# Patient Record
Sex: FEMALE | Race: WHITE | NOT HISPANIC OR LATINO | URBAN - METROPOLITAN AREA
[De-identification: names, ages, dates, MRNs, and addresses within clinical notes are randomized per-mention and may not be internally consistent; named-entity substitution may affect disease eponyms.]

---

## 2019-08-30 ENCOUNTER — OUTPATIENT (OUTPATIENT)
Dept: OUTPATIENT SERVICES | Facility: HOSPITAL | Age: 59
LOS: 1 days | End: 2019-08-30
Payer: COMMERCIAL

## 2019-08-30 VITALS
HEIGHT: 64.25 IN | RESPIRATION RATE: 20 BRPM | HEART RATE: 56 BPM | DIASTOLIC BLOOD PRESSURE: 82 MMHG | TEMPERATURE: 98 F | WEIGHT: 200.62 LBS | SYSTOLIC BLOOD PRESSURE: 130 MMHG

## 2019-08-30 DIAGNOSIS — E78.5 HYPERLIPIDEMIA, UNSPECIFIED: ICD-10-CM

## 2019-08-30 DIAGNOSIS — Z98.890 OTHER SPECIFIED POSTPROCEDURAL STATES: Chronic | ICD-10-CM

## 2019-08-30 DIAGNOSIS — K57.32 DIVERTICULITIS OF LARGE INTESTINE WITHOUT PERFORATION OR ABSCESS WITHOUT BLEEDING: ICD-10-CM

## 2019-08-30 DIAGNOSIS — Z29.9 ENCOUNTER FOR PROPHYLACTIC MEASURES, UNSPECIFIED: ICD-10-CM

## 2019-08-30 DIAGNOSIS — Z01.818 ENCOUNTER FOR OTHER PREPROCEDURAL EXAMINATION: ICD-10-CM

## 2019-08-30 DIAGNOSIS — Z98.1 ARTHRODESIS STATUS: Chronic | ICD-10-CM

## 2019-08-30 DIAGNOSIS — Z90.49 ACQUIRED ABSENCE OF OTHER SPECIFIED PARTS OF DIGESTIVE TRACT: Chronic | ICD-10-CM

## 2019-08-30 DIAGNOSIS — Z90.89 ACQUIRED ABSENCE OF OTHER ORGANS: Chronic | ICD-10-CM

## 2019-08-30 LAB
ALBUMIN SERPL ELPH-MCNC: 4.7 G/DL — SIGNIFICANT CHANGE UP (ref 3.3–5.2)
ALP SERPL-CCNC: 72 U/L — SIGNIFICANT CHANGE UP (ref 40–120)
ALT FLD-CCNC: 33 U/L — HIGH
ANION GAP SERPL CALC-SCNC: 11 MMOL/L — SIGNIFICANT CHANGE UP (ref 5–17)
APTT BLD: 33.5 SEC — SIGNIFICANT CHANGE UP (ref 27.5–36.3)
AST SERPL-CCNC: 31 U/L — SIGNIFICANT CHANGE UP
BASOPHILS # BLD AUTO: 0.07 K/UL — SIGNIFICANT CHANGE UP (ref 0–0.2)
BASOPHILS NFR BLD AUTO: 1 % — SIGNIFICANT CHANGE UP (ref 0–2)
BILIRUB SERPL-MCNC: <0.2 MG/DL — LOW (ref 0.4–2)
BLD GP AB SCN SERPL QL: SIGNIFICANT CHANGE UP
BUN SERPL-MCNC: 14 MG/DL — SIGNIFICANT CHANGE UP (ref 8–20)
CALCIUM SERPL-MCNC: 9.5 MG/DL — SIGNIFICANT CHANGE UP (ref 8.6–10.2)
CHLORIDE SERPL-SCNC: 103 MMOL/L — SIGNIFICANT CHANGE UP (ref 98–107)
CO2 SERPL-SCNC: 25 MMOL/L — SIGNIFICANT CHANGE UP (ref 22–29)
CREAT SERPL-MCNC: 0.7 MG/DL — SIGNIFICANT CHANGE UP (ref 0.5–1.3)
EOSINOPHIL # BLD AUTO: 0.39 K/UL — SIGNIFICANT CHANGE UP (ref 0–0.5)
EOSINOPHIL NFR BLD AUTO: 5.4 % — SIGNIFICANT CHANGE UP (ref 0–6)
GLUCOSE SERPL-MCNC: 112 MG/DL — SIGNIFICANT CHANGE UP (ref 70–115)
HBA1C BLD-MCNC: 5.6 % — SIGNIFICANT CHANGE UP (ref 4–5.6)
HCT VFR BLD CALC: 45.7 % — HIGH (ref 34.5–45)
HGB BLD-MCNC: 15.2 G/DL — SIGNIFICANT CHANGE UP (ref 11.5–15.5)
IMM GRANULOCYTES NFR BLD AUTO: 0.4 % — SIGNIFICANT CHANGE UP (ref 0–1.5)
INR BLD: 0.97 RATIO — SIGNIFICANT CHANGE UP (ref 0.88–1.16)
LYMPHOCYTES # BLD AUTO: 1.49 K/UL — SIGNIFICANT CHANGE UP (ref 1–3.3)
LYMPHOCYTES # BLD AUTO: 20.7 % — SIGNIFICANT CHANGE UP (ref 13–44)
MCHC RBC-ENTMCNC: 32.1 PG — SIGNIFICANT CHANGE UP (ref 27–34)
MCHC RBC-ENTMCNC: 33.3 GM/DL — SIGNIFICANT CHANGE UP (ref 32–36)
MCV RBC AUTO: 96.4 FL — SIGNIFICANT CHANGE UP (ref 80–100)
MONOCYTES # BLD AUTO: 0.6 K/UL — SIGNIFICANT CHANGE UP (ref 0–0.9)
MONOCYTES NFR BLD AUTO: 8.3 % — SIGNIFICANT CHANGE UP (ref 2–14)
NEUTROPHILS # BLD AUTO: 4.61 K/UL — SIGNIFICANT CHANGE UP (ref 1.8–7.4)
NEUTROPHILS NFR BLD AUTO: 64.2 % — SIGNIFICANT CHANGE UP (ref 43–77)
PLATELET # BLD AUTO: 271 K/UL — SIGNIFICANT CHANGE UP (ref 150–400)
POTASSIUM SERPL-MCNC: 4.9 MMOL/L — SIGNIFICANT CHANGE UP (ref 3.5–5.3)
POTASSIUM SERPL-SCNC: 4.9 MMOL/L — SIGNIFICANT CHANGE UP (ref 3.5–5.3)
PROT SERPL-MCNC: 7.2 G/DL — SIGNIFICANT CHANGE UP (ref 6.6–8.7)
PROTHROM AB SERPL-ACNC: 11.1 SEC — SIGNIFICANT CHANGE UP (ref 10–12.9)
RBC # BLD: 4.74 M/UL — SIGNIFICANT CHANGE UP (ref 3.8–5.2)
RBC # FLD: 12.3 % — SIGNIFICANT CHANGE UP (ref 10.3–14.5)
SODIUM SERPL-SCNC: 139 MMOL/L — SIGNIFICANT CHANGE UP (ref 135–145)
WBC # BLD: 7.19 K/UL — SIGNIFICANT CHANGE UP (ref 3.8–10.5)
WBC # FLD AUTO: 7.19 K/UL — SIGNIFICANT CHANGE UP (ref 3.8–10.5)

## 2019-08-30 PROCEDURE — 71046 X-RAY EXAM CHEST 2 VIEWS: CPT | Mod: 26

## 2019-08-30 PROCEDURE — 93010 ELECTROCARDIOGRAM REPORT: CPT

## 2019-08-30 RX ORDER — CEFOTETAN DISODIUM 1 G
2 VIAL (EA) INJECTION ONCE
Refills: 0 | Status: DISCONTINUED | OUTPATIENT
Start: 2019-09-06 | End: 2019-09-09

## 2019-08-30 NOTE — H&P PST ADULT - NSICDXPROBLEM_GEN_ALL_CORE_FT
PROBLEM DIAGNOSES  Problem: Need for prophylactic measure  Assessment and Plan: high risk for VTe event, the surgical team will evaluate for appropriate VTE prophylaxis     Problem: HLD (hyperlipidemia)  Assessment and Plan: routine labs and ekg  medical clearance     Problem: Diverticulitis of colon  Assessment and Plan: laparoscopic possible open resection of sigmoid diverticulitis and all related procedures and stent placement with DR. Jean

## 2019-08-30 NOTE — H&P PST ADULT - NSICDXPASTSURGICALHX_GEN_ALL_CORE_FT
PAST SURGICAL HISTORY:  H/O hand surgery right hand PAST SURGICAL HISTORY:  H/O hand surgery right hand    S/P cervical spinal fusion     S/P colon resection sigmoid colon    S/P tonsillectomy

## 2019-08-30 NOTE — H&P PST ADULT - NSICDXPASTMEDICALHX_GEN_ALL_CORE_FT
PAST MEDICAL HISTORY:  Blood clot in vein right arm after Portacath insertion, Had coumadin therapy, finished 4/2013    Depression     History of diverticulitis     History of diverticulosis     HLD (hyperlipidemia)     HNP (herniated nucleus pulposus), cervical     Hypothyroid     DOT on CPAP @13    Perforated diverticulum     Seizure no on medication, has been sz free for 4 years

## 2019-08-30 NOTE — PATIENT PROFILE ADULT - NSPROCHRONICPAINLOC_GEN_A_NUR
LMOM that tsh normal, continue current dosing
Priyanka Paniagua called looking for results of his recent lab draw   Thank you
lower abdomen

## 2019-08-30 NOTE — PATIENT PROFILE ADULT - STATED REASON FOR ADMISSION
Detail Level: Generalized
General Sunscreen Counseling: Sun protect with sunscreen and clothing. Remember to reapply sunscreen.
lap possible open resection of sigmoid

## 2019-08-30 NOTE — H&P PST ADULT - ASSESSMENT
59 year old female present with c/o LLQ abd pain.  CAT scan revealed diverticulitis and now the presence  of a large diverticulum adjacent to a prior sigmoid resection and anastomosis.  She is now schedule for laparoscopic possible open resection of sigmoid diverticulitis and all related procedures and ureteral stent placement with DR. Woodal CAPRINI VTE 2.0 SCORE [CLOT updated 2019]    AGE RELATED RISK FACTORS                                                       MOBILITY RELATED FACTORS  [x ] Age 41-60 years                                            (1 Point)                    [ ] Bed rest                                                        (1 Point)  [ ] Age: 61-74 years                                           (2 Points)                  [ ] Plaster cast                                                   (2 Points)  [ ] Age= 75 years                                              (3 Points)                    [ ] Bed bound for more than 72 hours                 (2 Points)    DISEASE RELATED RISK FACTORS                                               GENDER SPECIFIC FACTORS  [ ] Edema in the lower extremities                       (1 Point)              [ ] Pregnancy                                                     (1 Point)  [x ] Varicose veins                                               (1 Point)                     [ ] Post-partum < 6 weeks                                   (1 Point)             [x ] BMI > 25 Kg/m2                                            (1 Point)                     [ ] Hormonal therapy  or oral contraception          (1 Point)                 [ ] Sepsis (in the previous month)                        (1 Point)               [ ] History of pregnancy complications                 (1 point)  [ ] Pneumonia or serious lung disease                                               [ ] Unexplained or recurrent                     (1 Point)           (in the previous month)                               (1 Point)  [ ] Abnormal pulmonary function test                     (1 Point)                 SURGERY RELATED RISK FACTORS  [ ] Acute myocardial infarction                              (1 Point)               [ ]  Section                                             (1 Point)  [ ] Congestive heart failure (in the previous month)  (1 Point)      [ ] Minor surgery                                                  (1 Point)   [x ] Inflammatory bowel disease                             (1 Point)               [ ] Arthroscopic surgery                                        (2 Points)  [ ] Central venous access                                      (2 Points)                [ ]x General surgery lasting more than 45 minutes (2 points)  [ ] Malignancy- Present or previous                   (2 Points)                [ ] Elective arthroplasty                                         (5 points)    [ ] Stroke (in the previous month)                          (5 Points)                                                                                                                                                           HEMATOLOGY RELATED FACTORS                                                 TRAUMA RELATED RISK FACTORS  [x ] Prior episodes of VTE                                     (3 Points)                [ ] Fracture of the hip, pelvis, or leg                       (5 Points)  [ ] Positive family history for VTE                         (3 Points)             [ ] Acute spinal cord injury (in the previous month)  (5 Points)  [ ] Prothrombin 59339 A                                     (3 Points)               [ ] Paralysis  (less than 1 month)                             (5 Points)  [ ] Factor V Leiden                                             (3 Points)                  [ ] Multiple Trauma within 1 month                        (5 Points)  [ ] Lupus anticoagulants                                     (3 Points)                                                           [ ] Anticardiolipin antibodies                               (3 Points)                                                       [ ] High homocysteine in the blood                      (3 Points)                                             [ ] Other congenital or acquired thrombophilia      (3 Points)                                                [ ] Heparin induced thrombocytopenia                  (3 Points)                                     Total Score [    9    ]

## 2019-08-30 NOTE — H&P PST ADULT - NSICDXFAMILYHX_GEN_ALL_CORE_FT
FAMILY HISTORY:  FH: diverticulitis    Father  Still living? Unknown  FH: HTN (hypertension), Age at diagnosis: Age Unknown    Mother  Still living? Unknown  FH: breast cancer, Age at diagnosis: Age Unknown  FH: HTN (hypertension), Age at diagnosis: Age Unknown  FH: thyroid disease, Age at diagnosis: Age Unknown    Sibling  Still living? Unknown  FH: HTN (hypertension), Age at diagnosis: Age Unknown    Grandparent  Still living? Unknown  FH: breast cancer, Age at diagnosis: Age Unknown  FH: colon cancer, Age at diagnosis: Age Unknown  FH: HTN (hypertension), Age at diagnosis: Age Unknown    Aunt  Still living? Unknown  FH: breast cancer, Age at diagnosis: Age Unknown  FH: HTN (hypertension), Age at diagnosis: Age Unknown

## 2019-08-30 NOTE — H&P PST ADULT - HISTORY OF PRESENT ILLNESS
59 year old female present with c/o LLQ abd pain.  CAT scan revealed diverticulitis and now the presence  of a large diverticulum adjacent to a prior sigmoid resection and anastomosis.  She is now schedule for laparoscopic possible open resection of sigmoid diverticulitis and all related procedures and ureteral stent placement with DR. Jean

## 2019-08-30 NOTE — PATIENT PROFILE ADULT - NSPROEDALEARNPREF_GEN_A_NUR
individual instruction/verbal instruction/written material/pre-op instructions, surgical wash & pain management reviewed

## 2019-08-30 NOTE — H&P PST ADULT - NEGATIVE CARDIOVASCULAR SYMPTOMS
no paroxysmal nocturnal dyspnea/no palpitations/no peripheral edema/no chest pain/no dyspnea on exertion/no orthopnea

## 2019-09-05 ENCOUNTER — TRANSCRIPTION ENCOUNTER (OUTPATIENT)
Age: 59
End: 2019-09-05

## 2019-09-06 ENCOUNTER — RESULT REVIEW (OUTPATIENT)
Age: 59
End: 2019-09-06

## 2019-09-06 ENCOUNTER — INPATIENT (INPATIENT)
Facility: HOSPITAL | Age: 59
LOS: 2 days | Discharge: HOME | DRG: 330 | End: 2019-09-09
Attending: SURGERY | Admitting: SURGERY
Payer: COMMERCIAL

## 2019-09-06 VITALS
SYSTOLIC BLOOD PRESSURE: 120 MMHG | TEMPERATURE: 98 F | WEIGHT: 200.62 LBS | OXYGEN SATURATION: 98 % | HEART RATE: 68 BPM | HEIGHT: 64 IN | RESPIRATION RATE: 16 BRPM | DIASTOLIC BLOOD PRESSURE: 80 MMHG

## 2019-09-06 DIAGNOSIS — Z98.1 ARTHRODESIS STATUS: Chronic | ICD-10-CM

## 2019-09-06 DIAGNOSIS — K57.32 DIVERTICULITIS OF LARGE INTESTINE WITHOUT PERFORATION OR ABSCESS WITHOUT BLEEDING: ICD-10-CM

## 2019-09-06 DIAGNOSIS — Z98.890 OTHER SPECIFIED POSTPROCEDURAL STATES: Chronic | ICD-10-CM

## 2019-09-06 DIAGNOSIS — Z90.89 ACQUIRED ABSENCE OF OTHER ORGANS: Chronic | ICD-10-CM

## 2019-09-06 DIAGNOSIS — Z90.49 ACQUIRED ABSENCE OF OTHER SPECIFIED PARTS OF DIGESTIVE TRACT: Chronic | ICD-10-CM

## 2019-09-06 LAB
ABO RH CONFIRMATION: SIGNIFICANT CHANGE UP
GLUCOSE BLDC GLUCOMTR-MCNC: 112 MG/DL — HIGH (ref 70–99)
GLUCOSE BLDC GLUCOMTR-MCNC: 138 MG/DL — HIGH (ref 70–99)
GLUCOSE BLDC GLUCOMTR-MCNC: 159 MG/DL — HIGH (ref 70–99)

## 2019-09-06 PROCEDURE — 85730 THROMBOPLASTIN TIME PARTIAL: CPT

## 2019-09-06 PROCEDURE — 93005 ELECTROCARDIOGRAM TRACING: CPT

## 2019-09-06 PROCEDURE — 71046 X-RAY EXAM CHEST 2 VIEWS: CPT

## 2019-09-06 PROCEDURE — G0463: CPT

## 2019-09-06 PROCEDURE — 86901 BLOOD TYPING SEROLOGIC RH(D): CPT

## 2019-09-06 PROCEDURE — 86850 RBC ANTIBODY SCREEN: CPT

## 2019-09-06 PROCEDURE — 86923 COMPATIBILITY TEST ELECTRIC: CPT

## 2019-09-06 PROCEDURE — 88307 TISSUE EXAM BY PATHOLOGIST: CPT | Mod: 26

## 2019-09-06 PROCEDURE — 85027 COMPLETE CBC AUTOMATED: CPT

## 2019-09-06 PROCEDURE — 36415 COLL VENOUS BLD VENIPUNCTURE: CPT

## 2019-09-06 PROCEDURE — 83036 HEMOGLOBIN GLYCOSYLATED A1C: CPT

## 2019-09-06 PROCEDURE — 80053 COMPREHEN METABOLIC PANEL: CPT

## 2019-09-06 PROCEDURE — 85610 PROTHROMBIN TIME: CPT

## 2019-09-06 PROCEDURE — 86900 BLOOD TYPING SEROLOGIC ABO: CPT

## 2019-09-06 PROCEDURE — 88305 TISSUE EXAM BY PATHOLOGIST: CPT | Mod: 26

## 2019-09-06 RX ORDER — CITALOPRAM 10 MG/1
40 TABLET, FILM COATED ORAL DAILY
Refills: 0 | Status: DISCONTINUED | OUTPATIENT
Start: 2019-09-06 | End: 2019-09-09

## 2019-09-06 RX ORDER — ALVIMOPAN 12 MG/1
12 CAPSULE ORAL
Refills: 0 | Status: DISCONTINUED | OUTPATIENT
Start: 2019-09-07 | End: 2019-09-09

## 2019-09-06 RX ORDER — SODIUM CHLORIDE 9 MG/ML
3 INJECTION INTRAMUSCULAR; INTRAVENOUS; SUBCUTANEOUS EVERY 8 HOURS
Refills: 0 | Status: DISCONTINUED | OUTPATIENT
Start: 2019-09-06 | End: 2019-09-06

## 2019-09-06 RX ORDER — ONDANSETRON 8 MG/1
4 TABLET, FILM COATED ORAL ONCE
Refills: 0 | Status: DISCONTINUED | OUTPATIENT
Start: 2019-09-06 | End: 2019-09-06

## 2019-09-06 RX ORDER — HYDROMORPHONE HYDROCHLORIDE 2 MG/ML
1 INJECTION INTRAMUSCULAR; INTRAVENOUS; SUBCUTANEOUS
Refills: 0 | Status: DISCONTINUED | OUTPATIENT
Start: 2019-09-06 | End: 2019-09-06

## 2019-09-06 RX ORDER — ALVIMOPAN 12 MG/1
12 CAPSULE ORAL ONCE
Refills: 0 | Status: COMPLETED | OUTPATIENT
Start: 2019-09-06 | End: 2019-09-06

## 2019-09-06 RX ORDER — ENOXAPARIN SODIUM 100 MG/ML
40 INJECTION SUBCUTANEOUS DAILY
Refills: 0 | Status: DISCONTINUED | OUTPATIENT
Start: 2019-09-07 | End: 2019-09-09

## 2019-09-06 RX ORDER — BUPIVACAINE 13.3 MG/ML
20 INJECTION, SUSPENSION, LIPOSOMAL INFILTRATION ONCE
Refills: 0 | Status: DISCONTINUED | OUTPATIENT
Start: 2019-09-06 | End: 2019-09-06

## 2019-09-06 RX ORDER — HYDROMORPHONE HYDROCHLORIDE 2 MG/ML
30 INJECTION INTRAMUSCULAR; INTRAVENOUS; SUBCUTANEOUS
Refills: 0 | Status: DISCONTINUED | OUTPATIENT
Start: 2019-09-06 | End: 2019-09-08

## 2019-09-06 RX ORDER — ONDANSETRON 8 MG/1
4 TABLET, FILM COATED ORAL EVERY 6 HOURS
Refills: 0 | Status: DISCONTINUED | OUTPATIENT
Start: 2019-09-06 | End: 2019-09-09

## 2019-09-06 RX ORDER — SODIUM CHLORIDE 9 MG/ML
1000 INJECTION, SOLUTION INTRAVENOUS
Refills: 0 | Status: DISCONTINUED | OUTPATIENT
Start: 2019-09-06 | End: 2019-09-06

## 2019-09-06 RX ORDER — LEVOTHYROXINE SODIUM 125 MCG
1 TABLET ORAL
Qty: 0 | Refills: 0 | DISCHARGE

## 2019-09-06 RX ORDER — TAMSULOSIN HYDROCHLORIDE 0.4 MG/1
0.4 CAPSULE ORAL AT BEDTIME
Refills: 0 | Status: DISCONTINUED | OUTPATIENT
Start: 2019-09-06 | End: 2019-09-09

## 2019-09-06 RX ORDER — CITALOPRAM 10 MG/1
1 TABLET, FILM COATED ORAL
Qty: 0 | Refills: 0 | DISCHARGE

## 2019-09-06 RX ORDER — SODIUM CHLORIDE 9 MG/ML
1000 INJECTION, SOLUTION INTRAVENOUS
Refills: 0 | Status: DISCONTINUED | OUTPATIENT
Start: 2019-09-06 | End: 2019-09-09

## 2019-09-06 RX ORDER — ATORVASTATIN CALCIUM 80 MG/1
20 TABLET, FILM COATED ORAL AT BEDTIME
Refills: 0 | Status: DISCONTINUED | OUTPATIENT
Start: 2019-09-06 | End: 2019-09-09

## 2019-09-06 RX ORDER — ATORVASTATIN CALCIUM 80 MG/1
1 TABLET, FILM COATED ORAL
Qty: 0 | Refills: 0 | DISCHARGE

## 2019-09-06 RX ORDER — LEVOTHYROXINE SODIUM 125 MCG
137 TABLET ORAL DAILY
Refills: 0 | Status: DISCONTINUED | OUTPATIENT
Start: 2019-09-06 | End: 2019-09-09

## 2019-09-06 RX ORDER — ACETAMINOPHEN 500 MG
650 TABLET ORAL EVERY 6 HOURS
Refills: 0 | Status: DISCONTINUED | OUTPATIENT
Start: 2019-09-06 | End: 2019-09-09

## 2019-09-06 RX ADMIN — TAMSULOSIN HYDROCHLORIDE 0.4 MILLIGRAM(S): 0.4 CAPSULE ORAL at 21:30

## 2019-09-06 RX ADMIN — Medication 650 MILLIGRAM(S): at 23:47

## 2019-09-06 RX ADMIN — Medication 650 MILLIGRAM(S): at 19:10

## 2019-09-06 RX ADMIN — SODIUM CHLORIDE 75 MILLILITER(S): 9 INJECTION, SOLUTION INTRAVENOUS at 21:31

## 2019-09-06 RX ADMIN — Medication 650 MILLIGRAM(S): at 18:10

## 2019-09-06 RX ADMIN — HYDROMORPHONE HYDROCHLORIDE 30 MILLILITER(S): 2 INJECTION INTRAMUSCULAR; INTRAVENOUS; SUBCUTANEOUS at 13:51

## 2019-09-06 RX ADMIN — CITALOPRAM 40 MILLIGRAM(S): 10 TABLET, FILM COATED ORAL at 21:30

## 2019-09-06 RX ADMIN — ALVIMOPAN 12 MILLIGRAM(S): 12 CAPSULE ORAL at 07:04

## 2019-09-06 RX ADMIN — ATORVASTATIN CALCIUM 20 MILLIGRAM(S): 80 TABLET, FILM COATED ORAL at 21:30

## 2019-09-06 NOTE — BRIEF OPERATIVE NOTE - NSICDXBRIEFPROCEDURE_GEN_ALL_CORE_FT
PROCEDURES:  Ventral hernia repair with mesh 06-Sep-2019 13:34:22  Nicholas Moise  Laparoscopic low anterior resection 06-Sep-2019 13:33:58  Nicholas Moise

## 2019-09-06 NOTE — BRIEF OPERATIVE NOTE - OPERATION/FINDINGS
Pt w/ diverticula near prior sigmoid resection site. performed laparoscopic hand assisted low anterior resection performed w/ removal of prior anastomosis and diverticular disease. Incisional hernia repaired w/ parietex mesh.

## 2019-09-06 NOTE — ASU PREOP CHECKLIST - SELECT TESTS ORDERED
INR/Type and Screen/CXR/EKG/BMP/CBC/PT/PTT 112/BMP/POCT Blood Glucose/CBC/Type and Screen/PT/PTT/INR/EKG/CXR

## 2019-09-06 NOTE — CONSULT NOTE ADULT - SUBJECTIVE AND OBJECTIVE BOX
HPI:  9 year old female present with c/o LLQ abd pain.  CAT scan revealed diverticulitis and now the presence  of a large diverticulum adjacent to a prior sigmoid resection and anastomosis.  She is now schedule for laparoscopic possible open resection of sigmoid diverticulitis and all related procedures and ureteral stent placement with DR. Jean. Post op Now        PAST MEDICAL & SURGICAL HISTORY:  Perforated diverticulum  Hypothyroid  Seizure: no on medication, has been sz free for 4 years  DOT on CPAP: @13  HLD (hyperlipidemia)  History of diverticulitis  History of diverticulosis  Depression  HNP (herniated nucleus pulposus), cervical  Blood clot in vein: right arm after Portacath insertion, Had coumadin therapy, finished 4/2013  S/P tonsillectomy  S/P colon resection: sigmoid colon  S/P cervical spinal fusion  H/O hand surgery: right hand    acetaminophen   Tablet .. 650 milliGRAM(s) Oral every 6 hours  atorvastatin 20 milliGRAM(s) Oral at bedtime  cefoTEtan  IVPB 2 Gram(s) IV Intermittent once  citalopram 40 milliGRAM(s) Oral daily  HYDROmorphone PCA (1 mG/mL) 30 milliLiter(s) PCA Continuous PCA Continuous  lactated ringers. 1000 milliLiter(s) IV Continuous <Continuous>  levothyroxine 137 MICROGram(s) Oral daily  ondansetron Injectable 4 milliGRAM(s) IV Push every 6 hours PRN    MEDICATIONS  (STANDING):  acetaminophen   Tablet .. 650 milliGRAM(s) Oral every 6 hours  atorvastatin 20 milliGRAM(s) Oral at bedtime  cefoTEtan  IVPB 2 Gram(s) IV Intermittent once  citalopram 40 milliGRAM(s) Oral daily  HYDROmorphone PCA (1 mG/mL) 30 milliLiter(s) PCA Continuous PCA Continuous  lactated ringers. 1000 milliLiter(s) (75 mL/Hr) IV Continuous <Continuous>  levothyroxine 137 MICROGram(s) Oral daily    MEDICATIONS  (PRN):  ondansetron Injectable 4 milliGRAM(s) IV Push every 6 hours PRN Nausea and/or Vomiting      Allergies    cortisone (Headache)  Dilantin (Swelling)  Oxymetazoline (Rash)  prednisoLONE (Headache)  Zithromax (Hives)    SOCIAL HISTORY:  NO S/D/IVDU    FAMILY HISTORY:  + Diverticulitis     FH: colon cancer (Grandparent)  FH: HTN (hypertension) (Mother, Grandparent, Aunt, Father, Sibling)  FH: thyroid disease (Mother)  FH: breast cancer (Mother, Grandparent, Aunt)  FH: diverticulitis          ROS  - Headache  - Neck Stiffness  - Chest Pain  - SOB  Mild Abd pain  - Pelvic Pain  - Leg Pain      Vital Signs Last 24 Hrs  T(C): 36.8 (06 Sep 2019 15:17), Max: 37.1 (06 Sep 2019 13:30)  T(F): 98.2 (06 Sep 2019 15:17), Max: 98.8 (06 Sep 2019 13:30)  HR: 80 (06 Sep 2019 15:17) (68 - 90)  BP: 120/76 (06 Sep 2019 15:17) (120/76 - 137/79)  BP(mean): --  RR: 18 (06 Sep 2019 15:17) (15 - 19)  SpO2: 93% (06 Sep 2019 15:17) (93% - 99%)    HEENT: PEARLA  Neck: Supple  Cardio: S1 S2 No Murmur  Pulm: CTA No Rales or Ronchi  Abd: Soft NT Mild Distension BS dec Incisions clean   Rectal - refused  Ext: No DCT  Skin: No Rash  Neuro: Awake Pleasant    Perforated diverticulum - postop pain control and ambulation  Hypothyroid - synthroid   Seizure: free for 4 years  DOT on CPAP - night  monitor   Depression - citalopram HPI:  9 year old female present with c/o LLQ abd pain.  CAT scan revealed diverticulitis and now the presence  of a large diverticulum adjacent to a prior sigmoid resection and anastomosis.  She is now schedule for laparoscopic possible open resection of sigmoid diverticulitis and all related procedures and ureteral stent placement with DR. Jaen. Post op Now        PAST MEDICAL & SURGICAL HISTORY:  Perforated diverticulum  Hypothyroid  Seizure: no on medication, has been sz free for 4 years  DOT on CPAP: @13  HLD (hyperlipidemia)  History of diverticulitis  History of diverticulosis  Depression  HNP (herniated nucleus pulposus), cervical  Blood clot in vein: right arm after Portacath insertion, Had coumadin therapy, finished 4/2013  S/P tonsillectomy  S/P colon resection: sigmoid colon  S/P cervical spinal fusion  H/O hand surgery: right hand    acetaminophen   Tablet .. 650 milliGRAM(s) Oral every 6 hours  atorvastatin 20 milliGRAM(s) Oral at bedtime  cefoTEtan  IVPB 2 Gram(s) IV Intermittent once  citalopram 40 milliGRAM(s) Oral daily  HYDROmorphone PCA (1 mG/mL) 30 milliLiter(s) PCA Continuous PCA Continuous  lactated ringers. 1000 milliLiter(s) IV Continuous <Continuous>  levothyroxine 137 MICROGram(s) Oral daily  ondansetron Injectable 4 milliGRAM(s) IV Push every 6 hours PRN    MEDICATIONS  (STANDING):  acetaminophen   Tablet .. 650 milliGRAM(s) Oral every 6 hours  atorvastatin 20 milliGRAM(s) Oral at bedtime  cefoTEtan  IVPB 2 Gram(s) IV Intermittent once  citalopram 40 milliGRAM(s) Oral daily  HYDROmorphone PCA (1 mG/mL) 30 milliLiter(s) PCA Continuous PCA Continuous  lactated ringers. 1000 milliLiter(s) (75 mL/Hr) IV Continuous <Continuous>  levothyroxine 137 MICROGram(s) Oral daily    MEDICATIONS  (PRN):  ondansetron Injectable 4 milliGRAM(s) IV Push every 6 hours PRN Nausea and/or Vomiting      Allergies    cortisone (Headache)  Dilantin (Swelling)  Oxymetazoline (Rash)  prednisoLONE (Headache)  Zithromax (Hives)    SOCIAL HISTORY:  NO S/D/IVDU    FAMILY HISTORY:  + Diverticulitis     FH: colon cancer (Grandparent)  FH: HTN (hypertension) (Mother, Grandparent, Aunt, Father, Sibling)  FH: thyroid disease (Mother)  FH: breast cancer (Mother, Grandparent, Aunt)  FH: diverticulitis          ROS  - Headache  - Neck Stiffness  - Chest Pain  - SOB  Mild Abd pain  - Pelvic Pain  - Leg Pain      Vital Signs Last 24 Hrs  T(C): 36.8 (06 Sep 2019 15:17), Max: 37.1 (06 Sep 2019 13:30)  T(F): 98.2 (06 Sep 2019 15:17), Max: 98.8 (06 Sep 2019 13:30)  HR: 80 (06 Sep 2019 15:17) (68 - 90)  BP: 120/76 (06 Sep 2019 15:17) (120/76 - 137/79)  BP(mean): --  RR: 18 (06 Sep 2019 15:17) (15 - 19)  SpO2: 93% (06 Sep 2019 15:17) (93% - 99%)    HEENT: PEARLA  Neck: Supple  Cardio: S1 S2 No Murmur  Pulm: CTA No Rales or Ronchi  Abd: Soft NT Mild Distension BS dec Incisions clean   Rectal - refused  Ext: No DCT  Skin: No Rash  Neuro: Awake Pleasant    Perforated diverticulum - postop pain control and ambulation  Hypothyroid - synthroid   Seizure: free for 4 years  DOT on CPAP - night  monitor   Depression - citalopram   Hyperglycemia - Stress induced will follow HPI:  9 year old female present with c/o LLQ abd pain.  CAT scan revealed diverticulitis and now the presence  of a large diverticulum adjacent to a prior sigmoid resection and anastomosis.  She is now schedule for laparoscopic possible open resection of sigmoid diverticulitis and all related procedures and ureteral stent placement with DR. Jean. Post op Now        PAST MEDICAL & SURGICAL HISTORY:  Perforated diverticulum  Hypothyroid  Seizure: no on medication, has been sz free for 4 years  DOT on CPAP: @13  HLD (hyperlipidemia)  History of diverticulitis  History of diverticulosis  Depression  HNP (herniated nucleus pulposus), cervical  Blood clot in vein: right arm after Portacath insertion, Had coumadin therapy, finished 4/2013  S/P tonsillectomy  S/P colon resection: sigmoid colon  S/P cervical spinal fusion  H/O hand surgery: right hand    acetaminophen   Tablet .. 650 milliGRAM(s) Oral every 6 hours  atorvastatin 20 milliGRAM(s) Oral at bedtime  cefoTEtan  IVPB 2 Gram(s) IV Intermittent once  citalopram 40 milliGRAM(s) Oral daily  HYDROmorphone PCA (1 mG/mL) 30 milliLiter(s) PCA Continuous PCA Continuous  lactated ringers. 1000 milliLiter(s) IV Continuous <Continuous>  levothyroxine 137 MICROGram(s) Oral daily  ondansetron Injectable 4 milliGRAM(s) IV Push every 6 hours PRN    MEDICATIONS  (STANDING):  acetaminophen   Tablet .. 650 milliGRAM(s) Oral every 6 hours  atorvastatin 20 milliGRAM(s) Oral at bedtime  cefoTEtan  IVPB 2 Gram(s) IV Intermittent once  citalopram 40 milliGRAM(s) Oral daily  HYDROmorphone PCA (1 mG/mL) 30 milliLiter(s) PCA Continuous PCA Continuous  lactated ringers. 1000 milliLiter(s) (75 mL/Hr) IV Continuous <Continuous>  levothyroxine 137 MICROGram(s) Oral daily    MEDICATIONS  (PRN):  ondansetron Injectable 4 milliGRAM(s) IV Push every 6 hours PRN Nausea and/or Vomiting      Allergies    cortisone (Headache)  Dilantin (Swelling)  Oxymetazoline (Rash)  prednisoLONE (Headache)  Zithromax (Hives)    SOCIAL HISTORY:  NO S/D/IVDU    FAMILY HISTORY:  + Diverticulitis     FH: colon cancer (Grandparent)  FH: HTN (hypertension) (Mother, Grandparent, Aunt, Father, Sibling)  FH: thyroid disease (Mother)  FH: breast cancer (Mother, Grandparent, Aunt)  FH: diverticulitis          ROS  - Headache  - Neck Stiffness  - Chest Pain  - SOB  Mild Abd pain  - Pelvic Pain  - Leg Pain      Vital Signs Last 24 Hrs  T(C): 36.8 (06 Sep 2019 15:17), Max: 37.1 (06 Sep 2019 13:30)  T(F): 98.2 (06 Sep 2019 15:17), Max: 98.8 (06 Sep 2019 13:30)  HR: 80 (06 Sep 2019 15:17) (68 - 90)  BP: 120/76 (06 Sep 2019 15:17) (120/76 - 137/79)  BP(mean): --  RR: 18 (06 Sep 2019 15:17) (15 - 19)  SpO2: 93% (06 Sep 2019 15:17) (93% - 99%)    HEENT: PEARLA  Neck: Supple  Cardio: S1 S2 No Murmur  Pulm: CTA No Rales or Ronchi  Abd: Soft NT Mild Distension BS dec Incisions clean   Rectal - refused  Ext: No DCT  Skin: No Rash  Neuro: Awake Pleasant    Perforated diverticulum - postop pain control and ambulation  Hypothyroid - synthroid   Seizure: free for 4 years  DOT on CPAP - night  monitor   Depression - citalopram   Hyperglycemia - Stress induced will follow  Urinary Retention - Flomax

## 2019-09-07 LAB
ANION GAP SERPL CALC-SCNC: 9 MMOL/L — SIGNIFICANT CHANGE UP (ref 5–17)
BASOPHILS # BLD AUTO: 0.02 K/UL — SIGNIFICANT CHANGE UP (ref 0–0.2)
BASOPHILS NFR BLD AUTO: 0.2 % — SIGNIFICANT CHANGE UP (ref 0–2)
BUN SERPL-MCNC: 13 MG/DL — SIGNIFICANT CHANGE UP (ref 8–20)
CALCIUM SERPL-MCNC: 8.4 MG/DL — LOW (ref 8.6–10.2)
CHLORIDE SERPL-SCNC: 104 MMOL/L — SIGNIFICANT CHANGE UP (ref 98–107)
CO2 SERPL-SCNC: 26 MMOL/L — SIGNIFICANT CHANGE UP (ref 22–29)
CREAT SERPL-MCNC: 0.84 MG/DL — SIGNIFICANT CHANGE UP (ref 0.5–1.3)
EOSINOPHIL # BLD AUTO: 0.01 K/UL — SIGNIFICANT CHANGE UP (ref 0–0.5)
EOSINOPHIL NFR BLD AUTO: 0.1 % — SIGNIFICANT CHANGE UP (ref 0–6)
GLUCOSE SERPL-MCNC: 137 MG/DL — HIGH (ref 70–115)
HCT VFR BLD CALC: 41.6 % — SIGNIFICANT CHANGE UP (ref 34.5–45)
HGB BLD-MCNC: 12.9 G/DL — SIGNIFICANT CHANGE UP (ref 11.5–15.5)
IMM GRANULOCYTES NFR BLD AUTO: 0.4 % — SIGNIFICANT CHANGE UP (ref 0–1.5)
LYMPHOCYTES # BLD AUTO: 1.03 K/UL — SIGNIFICANT CHANGE UP (ref 1–3.3)
LYMPHOCYTES # BLD AUTO: 9.9 % — LOW (ref 13–44)
MAGNESIUM SERPL-MCNC: 1.9 MG/DL — SIGNIFICANT CHANGE UP (ref 1.6–2.6)
MCHC RBC-ENTMCNC: 30.9 PG — SIGNIFICANT CHANGE UP (ref 27–34)
MCHC RBC-ENTMCNC: 31 GM/DL — LOW (ref 32–36)
MCV RBC AUTO: 99.5 FL — SIGNIFICANT CHANGE UP (ref 80–100)
MONOCYTES # BLD AUTO: 1.19 K/UL — HIGH (ref 0–0.9)
MONOCYTES NFR BLD AUTO: 11.5 % — SIGNIFICANT CHANGE UP (ref 2–14)
NEUTROPHILS # BLD AUTO: 8.09 K/UL — HIGH (ref 1.8–7.4)
NEUTROPHILS NFR BLD AUTO: 77.9 % — HIGH (ref 43–77)
PHOSPHATE SERPL-MCNC: 3.3 MG/DL — SIGNIFICANT CHANGE UP (ref 2.4–4.7)
PLATELET # BLD AUTO: 256 K/UL — SIGNIFICANT CHANGE UP (ref 150–400)
POTASSIUM SERPL-MCNC: 4.7 MMOL/L — SIGNIFICANT CHANGE UP (ref 3.5–5.3)
POTASSIUM SERPL-SCNC: 4.7 MMOL/L — SIGNIFICANT CHANGE UP (ref 3.5–5.3)
RBC # BLD: 4.18 M/UL — SIGNIFICANT CHANGE UP (ref 3.8–5.2)
RBC # FLD: 12.7 % — SIGNIFICANT CHANGE UP (ref 10.3–14.5)
SODIUM SERPL-SCNC: 139 MMOL/L — SIGNIFICANT CHANGE UP (ref 135–145)
WBC # BLD: 10.38 K/UL — SIGNIFICANT CHANGE UP (ref 3.8–10.5)
WBC # FLD AUTO: 10.38 K/UL — SIGNIFICANT CHANGE UP (ref 3.8–10.5)

## 2019-09-07 RX ORDER — HYDROMORPHONE HYDROCHLORIDE 2 MG/ML
0.5 INJECTION INTRAMUSCULAR; INTRAVENOUS; SUBCUTANEOUS ONCE
Refills: 0 | Status: DISCONTINUED | OUTPATIENT
Start: 2019-09-07 | End: 2019-09-07

## 2019-09-07 RX ORDER — MAGNESIUM SULFATE 500 MG/ML
2 VIAL (ML) INJECTION ONCE
Refills: 0 | Status: COMPLETED | OUTPATIENT
Start: 2019-09-07 | End: 2019-09-07

## 2019-09-07 RX ORDER — LACTOBACILLUS ACIDOPHILUS 100MM CELL
1 CAPSULE ORAL
Refills: 0 | Status: DISCONTINUED | OUTPATIENT
Start: 2019-09-07 | End: 2019-09-09

## 2019-09-07 RX ADMIN — Medication 650 MILLIGRAM(S): at 17:31

## 2019-09-07 RX ADMIN — Medication 650 MILLIGRAM(S): at 11:06

## 2019-09-07 RX ADMIN — Medication 650 MILLIGRAM(S): at 06:07

## 2019-09-07 RX ADMIN — HYDROMORPHONE HYDROCHLORIDE 0.5 MILLIGRAM(S): 2 INJECTION INTRAMUSCULAR; INTRAVENOUS; SUBCUTANEOUS at 00:50

## 2019-09-07 RX ADMIN — ALVIMOPAN 12 MILLIGRAM(S): 12 CAPSULE ORAL at 17:36

## 2019-09-07 RX ADMIN — TAMSULOSIN HYDROCHLORIDE 0.4 MILLIGRAM(S): 0.4 CAPSULE ORAL at 21:08

## 2019-09-07 RX ADMIN — Medication 50 GRAM(S): at 11:06

## 2019-09-07 RX ADMIN — CITALOPRAM 40 MILLIGRAM(S): 10 TABLET, FILM COATED ORAL at 21:08

## 2019-09-07 RX ADMIN — Medication 650 MILLIGRAM(S): at 05:37

## 2019-09-07 RX ADMIN — HYDROMORPHONE HYDROCHLORIDE 30 MILLILITER(S): 2 INJECTION INTRAMUSCULAR; INTRAVENOUS; SUBCUTANEOUS at 07:22

## 2019-09-07 RX ADMIN — Medication 650 MILLIGRAM(S): at 00:17

## 2019-09-07 RX ADMIN — ONDANSETRON 4 MILLIGRAM(S): 8 TABLET, FILM COATED ORAL at 17:31

## 2019-09-07 RX ADMIN — HYDROMORPHONE HYDROCHLORIDE 0.5 MILLIGRAM(S): 2 INJECTION INTRAMUSCULAR; INTRAVENOUS; SUBCUTANEOUS at 01:20

## 2019-09-07 RX ADMIN — ATORVASTATIN CALCIUM 20 MILLIGRAM(S): 80 TABLET, FILM COATED ORAL at 21:08

## 2019-09-07 RX ADMIN — HYDROMORPHONE HYDROCHLORIDE 30 MILLILITER(S): 2 INJECTION INTRAMUSCULAR; INTRAVENOUS; SUBCUTANEOUS at 19:15

## 2019-09-07 RX ADMIN — Medication 137 MICROGRAM(S): at 05:38

## 2019-09-07 RX ADMIN — Medication 1 TABLET(S): at 17:31

## 2019-09-07 RX ADMIN — ALVIMOPAN 12 MILLIGRAM(S): 12 CAPSULE ORAL at 05:38

## 2019-09-07 RX ADMIN — Medication 650 MILLIGRAM(S): at 12:05

## 2019-09-07 RX ADMIN — ENOXAPARIN SODIUM 40 MILLIGRAM(S): 100 INJECTION SUBCUTANEOUS at 11:09

## 2019-09-07 RX ADMIN — ONDANSETRON 4 MILLIGRAM(S): 8 TABLET, FILM COATED ORAL at 11:14

## 2019-09-08 LAB
ANION GAP SERPL CALC-SCNC: 10 MMOL/L — SIGNIFICANT CHANGE UP (ref 5–17)
BASOPHILS # BLD AUTO: 0.02 K/UL — SIGNIFICANT CHANGE UP (ref 0–0.2)
BASOPHILS NFR BLD AUTO: 0.2 % — SIGNIFICANT CHANGE UP (ref 0–2)
BUN SERPL-MCNC: 7 MG/DL — LOW (ref 8–20)
CALCIUM SERPL-MCNC: 8.2 MG/DL — LOW (ref 8.6–10.2)
CHLORIDE SERPL-SCNC: 102 MMOL/L — SIGNIFICANT CHANGE UP (ref 98–107)
CO2 SERPL-SCNC: 26 MMOL/L — SIGNIFICANT CHANGE UP (ref 22–29)
CREAT SERPL-MCNC: 0.7 MG/DL — SIGNIFICANT CHANGE UP (ref 0.5–1.3)
EOSINOPHIL # BLD AUTO: 0.28 K/UL — SIGNIFICANT CHANGE UP (ref 0–0.5)
EOSINOPHIL NFR BLD AUTO: 3.4 % — SIGNIFICANT CHANGE UP (ref 0–6)
GLUCOSE SERPL-MCNC: 129 MG/DL — HIGH (ref 70–115)
HCT VFR BLD CALC: 33.2 % — LOW (ref 34.5–45)
HGB BLD-MCNC: 10.5 G/DL — LOW (ref 11.5–15.5)
IMM GRANULOCYTES NFR BLD AUTO: 0.6 % — SIGNIFICANT CHANGE UP (ref 0–1.5)
LYMPHOCYTES # BLD AUTO: 1.16 K/UL — SIGNIFICANT CHANGE UP (ref 1–3.3)
LYMPHOCYTES # BLD AUTO: 14.2 % — SIGNIFICANT CHANGE UP (ref 13–44)
MAGNESIUM SERPL-MCNC: 2.2 MG/DL — SIGNIFICANT CHANGE UP (ref 1.6–2.6)
MCHC RBC-ENTMCNC: 31.1 PG — SIGNIFICANT CHANGE UP (ref 27–34)
MCHC RBC-ENTMCNC: 31.6 GM/DL — LOW (ref 32–36)
MCV RBC AUTO: 98.2 FL — SIGNIFICANT CHANGE UP (ref 80–100)
MONOCYTES # BLD AUTO: 0.87 K/UL — SIGNIFICANT CHANGE UP (ref 0–0.9)
MONOCYTES NFR BLD AUTO: 10.6 % — SIGNIFICANT CHANGE UP (ref 2–14)
NEUTROPHILS # BLD AUTO: 5.79 K/UL — SIGNIFICANT CHANGE UP (ref 1.8–7.4)
NEUTROPHILS NFR BLD AUTO: 71 % — SIGNIFICANT CHANGE UP (ref 43–77)
PHOSPHATE SERPL-MCNC: 2.5 MG/DL — SIGNIFICANT CHANGE UP (ref 2.4–4.7)
PLATELET # BLD AUTO: 207 K/UL — SIGNIFICANT CHANGE UP (ref 150–400)
POTASSIUM SERPL-MCNC: 3.6 MMOL/L — SIGNIFICANT CHANGE UP (ref 3.5–5.3)
POTASSIUM SERPL-SCNC: 3.6 MMOL/L — SIGNIFICANT CHANGE UP (ref 3.5–5.3)
RBC # BLD: 3.38 M/UL — LOW (ref 3.8–5.2)
RBC # FLD: 12.6 % — SIGNIFICANT CHANGE UP (ref 10.3–14.5)
SODIUM SERPL-SCNC: 138 MMOL/L — SIGNIFICANT CHANGE UP (ref 135–145)
WBC # BLD: 8.17 K/UL — SIGNIFICANT CHANGE UP (ref 3.8–10.5)
WBC # FLD AUTO: 8.17 K/UL — SIGNIFICANT CHANGE UP (ref 3.8–10.5)

## 2019-09-08 RX ORDER — OXYCODONE HYDROCHLORIDE 5 MG/1
5 TABLET ORAL EVERY 12 HOURS
Refills: 0 | Status: DISCONTINUED | OUTPATIENT
Start: 2019-09-08 | End: 2019-09-08

## 2019-09-08 RX ORDER — POTASSIUM CHLORIDE 20 MEQ
20 PACKET (EA) ORAL
Refills: 0 | Status: COMPLETED | OUTPATIENT
Start: 2019-09-08 | End: 2019-09-08

## 2019-09-08 RX ORDER — ACETAMINOPHEN 500 MG
650 TABLET ORAL EVERY 6 HOURS
Refills: 0 | Status: DISCONTINUED | OUTPATIENT
Start: 2019-09-08 | End: 2019-09-09

## 2019-09-08 RX ORDER — OXYCODONE HYDROCHLORIDE 5 MG/1
10 TABLET ORAL EVERY 12 HOURS
Refills: 0 | Status: DISCONTINUED | OUTPATIENT
Start: 2019-09-08 | End: 2019-09-08

## 2019-09-08 RX ORDER — OXYCODONE HYDROCHLORIDE 5 MG/1
5 TABLET ORAL EVERY 8 HOURS
Refills: 0 | Status: DISCONTINUED | OUTPATIENT
Start: 2019-09-08 | End: 2019-09-09

## 2019-09-08 RX ORDER — OXYCODONE HYDROCHLORIDE 5 MG/1
10 TABLET ORAL EVERY 8 HOURS
Refills: 0 | Status: DISCONTINUED | OUTPATIENT
Start: 2019-09-08 | End: 2019-09-09

## 2019-09-08 RX ADMIN — ENOXAPARIN SODIUM 40 MILLIGRAM(S): 100 INJECTION SUBCUTANEOUS at 13:26

## 2019-09-08 RX ADMIN — Medication 650 MILLIGRAM(S): at 05:06

## 2019-09-08 RX ADMIN — Medication 650 MILLIGRAM(S): at 18:30

## 2019-09-08 RX ADMIN — TAMSULOSIN HYDROCHLORIDE 0.4 MILLIGRAM(S): 0.4 CAPSULE ORAL at 22:33

## 2019-09-08 RX ADMIN — Medication 650 MILLIGRAM(S): at 00:37

## 2019-09-08 RX ADMIN — Medication 650 MILLIGRAM(S): at 17:43

## 2019-09-08 RX ADMIN — Medication 650 MILLIGRAM(S): at 01:35

## 2019-09-08 RX ADMIN — ALVIMOPAN 12 MILLIGRAM(S): 12 CAPSULE ORAL at 05:06

## 2019-09-08 RX ADMIN — Medication 650 MILLIGRAM(S): at 23:43

## 2019-09-08 RX ADMIN — Medication 650 MILLIGRAM(S): at 14:00

## 2019-09-08 RX ADMIN — Medication 137 MICROGRAM(S): at 05:06

## 2019-09-08 RX ADMIN — Medication 20 MILLIEQUIVALENT(S): at 17:42

## 2019-09-08 RX ADMIN — Medication 650 MILLIGRAM(S): at 06:00

## 2019-09-08 RX ADMIN — HYDROMORPHONE HYDROCHLORIDE 30 MILLILITER(S): 2 INJECTION INTRAMUSCULAR; INTRAVENOUS; SUBCUTANEOUS at 06:59

## 2019-09-08 RX ADMIN — CITALOPRAM 40 MILLIGRAM(S): 10 TABLET, FILM COATED ORAL at 22:33

## 2019-09-08 RX ADMIN — Medication 20 MILLIEQUIVALENT(S): at 13:24

## 2019-09-08 RX ADMIN — Medication 650 MILLIGRAM(S): at 13:24

## 2019-09-08 RX ADMIN — Medication 1 TABLET(S): at 17:42

## 2019-09-08 RX ADMIN — Medication 1 TABLET(S): at 05:06

## 2019-09-08 RX ADMIN — ATORVASTATIN CALCIUM 20 MILLIGRAM(S): 80 TABLET, FILM COATED ORAL at 22:33

## 2019-09-08 RX ADMIN — ALVIMOPAN 12 MILLIGRAM(S): 12 CAPSULE ORAL at 17:47

## 2019-09-09 ENCOUNTER — TRANSCRIPTION ENCOUNTER (OUTPATIENT)
Age: 59
End: 2019-09-09

## 2019-09-09 VITALS
HEART RATE: 90 BPM | DIASTOLIC BLOOD PRESSURE: 89 MMHG | RESPIRATION RATE: 18 BRPM | SYSTOLIC BLOOD PRESSURE: 134 MMHG | TEMPERATURE: 98 F | OXYGEN SATURATION: 94 %

## 2019-09-09 LAB
ANION GAP SERPL CALC-SCNC: 9 MMOL/L — SIGNIFICANT CHANGE UP (ref 5–17)
BUN SERPL-MCNC: 3 MG/DL — LOW (ref 8–20)
CALCIUM SERPL-MCNC: 8.1 MG/DL — LOW (ref 8.6–10.2)
CHLORIDE SERPL-SCNC: 105 MMOL/L — SIGNIFICANT CHANGE UP (ref 98–107)
CO2 SERPL-SCNC: 27 MMOL/L — SIGNIFICANT CHANGE UP (ref 22–29)
CREAT SERPL-MCNC: 0.62 MG/DL — SIGNIFICANT CHANGE UP (ref 0.5–1.3)
GLUCOSE SERPL-MCNC: 106 MG/DL — SIGNIFICANT CHANGE UP (ref 70–115)
HCT VFR BLD CALC: 33 % — LOW (ref 34.5–45)
HGB BLD-MCNC: 10.8 G/DL — LOW (ref 11.5–15.5)
MCHC RBC-ENTMCNC: 31.4 PG — SIGNIFICANT CHANGE UP (ref 27–34)
MCHC RBC-ENTMCNC: 32.7 GM/DL — SIGNIFICANT CHANGE UP (ref 32–36)
MCV RBC AUTO: 95.9 FL — SIGNIFICANT CHANGE UP (ref 80–100)
PLATELET # BLD AUTO: 207 K/UL — SIGNIFICANT CHANGE UP (ref 150–400)
POTASSIUM SERPL-MCNC: 3.3 MMOL/L — LOW (ref 3.5–5.3)
POTASSIUM SERPL-SCNC: 3.3 MMOL/L — LOW (ref 3.5–5.3)
RBC # BLD: 3.44 M/UL — LOW (ref 3.8–5.2)
RBC # FLD: 12.1 % — SIGNIFICANT CHANGE UP (ref 10.3–14.5)
SODIUM SERPL-SCNC: 141 MMOL/L — SIGNIFICANT CHANGE UP (ref 135–145)
WBC # BLD: 6.16 K/UL — SIGNIFICANT CHANGE UP (ref 3.8–10.5)
WBC # FLD AUTO: 6.16 K/UL — SIGNIFICANT CHANGE UP (ref 3.8–10.5)

## 2019-09-09 PROCEDURE — 80048 BASIC METABOLIC PNL TOTAL CA: CPT

## 2019-09-09 PROCEDURE — 88307 TISSUE EXAM BY PATHOLOGIST: CPT

## 2019-09-09 PROCEDURE — 88305 TISSUE EXAM BY PATHOLOGIST: CPT

## 2019-09-09 PROCEDURE — C1758: CPT

## 2019-09-09 PROCEDURE — 82962 GLUCOSE BLOOD TEST: CPT

## 2019-09-09 PROCEDURE — 84100 ASSAY OF PHOSPHORUS: CPT

## 2019-09-09 PROCEDURE — 36415 COLL VENOUS BLD VENIPUNCTURE: CPT

## 2019-09-09 PROCEDURE — 83735 ASSAY OF MAGNESIUM: CPT

## 2019-09-09 PROCEDURE — C1781: CPT

## 2019-09-09 PROCEDURE — 85027 COMPLETE CBC AUTOMATED: CPT

## 2019-09-09 RX ORDER — POTASSIUM CHLORIDE 20 MEQ
20 PACKET (EA) ORAL ONCE
Refills: 0 | Status: COMPLETED | OUTPATIENT
Start: 2019-09-09 | End: 2019-09-09

## 2019-09-09 RX ORDER — ACETAMINOPHEN 500 MG
2 TABLET ORAL
Qty: 0 | Refills: 0 | DISCHARGE
Start: 2019-09-09

## 2019-09-09 RX ORDER — NEOMYCIN SULFATE 500 MG/1
2 TABLET ORAL
Qty: 0 | Refills: 0 | DISCHARGE

## 2019-09-09 RX ADMIN — Medication 650 MILLIGRAM(S): at 07:15

## 2019-09-09 RX ADMIN — Medication 650 MILLIGRAM(S): at 11:07

## 2019-09-09 RX ADMIN — Medication 20 MILLIEQUIVALENT(S): at 10:35

## 2019-09-09 RX ADMIN — Medication 650 MILLIGRAM(S): at 12:00

## 2019-09-09 RX ADMIN — Medication 650 MILLIGRAM(S): at 06:15

## 2019-09-09 RX ADMIN — ALVIMOPAN 12 MILLIGRAM(S): 12 CAPSULE ORAL at 06:16

## 2019-09-09 RX ADMIN — Medication 650 MILLIGRAM(S): at 00:13

## 2019-09-09 RX ADMIN — Medication 137 MICROGRAM(S): at 06:15

## 2019-09-09 RX ADMIN — ENOXAPARIN SODIUM 40 MILLIGRAM(S): 100 INJECTION SUBCUTANEOUS at 11:07

## 2019-09-09 RX ADMIN — Medication 1 TABLET(S): at 06:15

## 2019-09-09 NOTE — PROGRESS NOTE ADULT - ASSESSMENT
60y/o female seen and examined at bedside. Admitted for LLQ pain with Diverticulitis  -Currently pain is well controlled with Dilaudid PCA.  -No Changes will be made to pain medications at this time
Assessment: 59 year old female POD #1 lap LAR, umbilical hernia repair w/ mesh and bilateral ureteral stent placement.    Plan:  Continue CLD  Poss. TOV this PM  Maintain PCA as per pain management  OOB and ambulation  IS  Continue Flomax
Patient doing well post-operatively  continue PCA use  CLD, f/u diet tolerance as patient has not passed flatus and is burping.  Has not voided, f/u tov and post void bladder scan if requiring straight-cath. Hematuria normal since she is s/p uretal stent placement.   OOB, IS
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   - S/p LAR, umbo repair with mesh, and ureteral stents-  - continue to monitor UOP.  - OOB, IS  - ARBF on enterd  - continue home medications  - pain control, will attempt to wean PCA to oral analgesics  - flomax   Lovenox
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  - S/p LAR, umbo repair with mesh, and ureteral stents-  - CRISTINO dressing to be exchanged today  - Monitor for gas/BM  - OOB, IS  - continue home medications  - PCA d/c'ed, PO pain medication ordered

## 2019-09-09 NOTE — DISCHARGE NOTE NURSING/CASE MANAGEMENT/SOCIAL WORK - PATIENT PORTAL LINK FT
You can access the FollowMyHealth Patient Portal offered by [facility] by registering at the following website: [email or web address]. By joining [employer]’s FollowMyHealth portal, you will also be able to view your health information using other applications (apps) compatible with our system.

## 2019-09-09 NOTE — PROGRESS NOTE ADULT - SUBJECTIVE AND OBJECTIVE BOX
Chief Complaint:    HPI: 59 year old female present with c/o LLQ abd pain.  CAT scan revealed diverticulitis and now the presence  of a large diverticulum adjacent to a prior sigmoid resection and anastomosis.  She is now schedule for laparoscopic possible open resection of sigmoid diverticulitis and all related procedures and ureteral stent placement with DR. Jean.     PAST MEDICAL & SURGICAL HISTORY:  Perforated diverticulum  Hypothyroid  Seizure: no on medication, has been sz free for 4 years  DOT on CPAP: @13  HLD (hyperlipidemia)  History of diverticulitis  History of diverticulosis  Depression  HNP (herniated nucleus pulposus), cervical  Blood clot in vein: right arm after Portacath insertion, Had coumadin therapy, finished 2013  S/P tonsillectomy  S/P colon resection: sigmoid colon  S/P cervical spinal fusion  H/O hand surgery: right hand      FAMILY HISTORY:  FH: colon cancer (Grandparent)  FH: HTN (hypertension) (Mother, Grandparent, Aunt, Father, Sibling)  FH: thyroid disease (Mother)  FH: breast cancer (Mother, Grandparent, Aunt)  FH: diverticulitis      SOCIAL HISTORY:  Denies Smoking, Alcohol, or Drug Use    Allergies    cortisone (Headache)  Dilantin (Swelling)  Oxymetazoline (Rash)  prednisoLONE (Headache)  Zithromax (Hives)    PAIN MEDICATIONS:  acetaminophen   Tablet .. 650 milliGRAM(s) Oral every 6 hours  citalopram 40 milliGRAM(s) Oral daily  HYDROmorphone PCA (1 mG/mL) 30 milliLiter(s) PCA Continuous PCA Continuous  ondansetron Injectable 4 milliGRAM(s) IV Push every 6 hours PRN    Heme:  enoxaparin Injectable 40 milliGRAM(s) SubCutaneous daily    Antibiotics:  cefoTEtan  IVPB 2 Gram(s) IV Intermittent once    Cardiovascular:  tamsulosin 0.4 milliGRAM(s) Oral at bedtime    GI:  alvimopan 12 milliGRAM(s) Oral two times a day    Endocrine:  atorvastatin 20 milliGRAM(s) Oral at bedtime  levothyroxine 137 MICROGram(s) Oral daily    All Other Medications:  lactated ringers. 1000 milliLiter(s) IV Continuous <Continuous>  lactobacillus acidophilus 1 Tablet(s) Oral two times a day      LABS:                          12.9   10.38 )-----------( 256      ( 07 Sep 2019 05:12 )             41.6         139  |  104  |  13.0  ----------------------------<  137<H>  4.7   |  26.0  |  0.84    Ca    8.4<L>      07 Sep 2019 05:12  Phos  3.3       Mg     1.9             Vital Signs Last 24 Hrs  T(C): 36.7 (07 Sep 2019 07:51), Max: 37.1 (06 Sep 2019 13:30)  T(F): 98.1 (07 Sep 2019 07:51), Max: 98.8 (06 Sep 2019 13:30)  HR: 81 (07 Sep 2019 07:51) (80 - 100)  BP: 105/68 (07 Sep 2019 07:51) (101/65 - 137/79)  BP(mean): --  RR: 18 (07 Sep 2019 07:51) (15 - 19)  SpO2: 98% (07 Sep 2019 07:51) (90% - 99%)    PAIN SCORE:     8    SCALE USED: (1-10)    OTHER SYMPTOMS (0 = None;  1 = Mild; 2 = Moderate; 3 = Severe)  Anorexia: 0          Dyspnea:0         Pruritus:0         Nausea:0             Agitation:0        Anxiety:0    Vomitin          Drowsiness:0    Depression:0  Constipation:0    Diarrhea:0        Other:0           PHYSICAL EXAM:    GENERAL: NAD, well-groomed, well-developed  NERVOUS SYSTEM:  Alert & Oriented X3, Good concentration;
HPI:     Allergies    cortisone (Headache)  Dilantin (Swelling)  Oxymetazoline (Rash)  prednisoLONE (Headache)  Zithromax (Hives)    Intolerances      Perforated diverticulum  Hypothyroid  Seizure  DOT on CPAP  HLD (hyperlipidemia)  History of diverticulitis  History of diverticulosis  Depression  HNP (herniated nucleus pulposus), cervical  Blood clot in vein    MEDICATIONS  (STANDING):  acetaminophen   Tablet .. 650 milliGRAM(s) Oral every 6 hours  alvimopan 12 milliGRAM(s) Oral two times a day  atorvastatin 20 milliGRAM(s) Oral at bedtime  cefoTEtan  IVPB 2 Gram(s) IV Intermittent once  citalopram 40 milliGRAM(s) Oral daily  enoxaparin Injectable 40 milliGRAM(s) SubCutaneous daily  HYDROmorphone PCA (1 mG/mL) 30 milliLiter(s) PCA Continuous PCA Continuous  lactated ringers. 1000 milliLiter(s) (75 mL/Hr) IV Continuous <Continuous>  lactobacillus acidophilus 1 Tablet(s) Oral two times a day  levothyroxine 137 MICROGram(s) Oral daily  tamsulosin 0.4 milliGRAM(s) Oral at bedtime    MEDICATIONS  (PRN):  ondansetron Injectable 4 milliGRAM(s) IV Push every 6 hours PRN Nausea and/or Vomiting                           12.9   10.38 )-----------( 256      ( 07 Sep 2019 05:12 )             41.6     09-07    139  |  104  |  13.0  ----------------------------<  137<H>  4.7   |  26.0  |  0.84    Ca    8.4<L>      07 Sep 2019 05:12  Phos  3.3     09-07  Mg     1.9     09-07      Vital Signs Last 24 Hrs  T(C): 37 (07 Sep 2019 16:15), Max: 37 (07 Sep 2019 16:15)  T(F): 98.6 (07 Sep 2019 16:15), Max: 98.6 (07 Sep 2019 16:15)  HR: 86 (07 Sep 2019 16:15) (81 - 100)  BP: 112/70 (07 Sep 2019 16:15) (101/65 - 123/75)  BP(mean): --  RR: 18 (07 Sep 2019 16:15) (18 - 18)  SpO2: 92% (07 Sep 2019 16:15) (90% - 98%)  CAPILLARY BLOOD GLUCOSE      09-06 @ 07:01  -  09-07 @ 07:00  --------------------------------------------------------  IN: 980 mL / OUT: 485 mL / NET: 495 mL    09-07 @ 07:01 - 09-07 @ 18:11  --------------------------------------------------------  IN: 450 mL / OUT: 0 mL / NET: 450 mL      Patient feeling better No CP, No SOB, No Occ nausea Tolerating clears   HEENT: PEARLA  Neck: Supple  Cardio: S1 S2 No Murmur  Pulm: No Rales or Ronchi Occas Lower Lobe crackle   Abd: Soft NT Mild Distension BS dec Incisions clean   Rectal - refused  Ext: No DCT  Skin: No Rash  Neuro: Awake Pleasant    Perforated diverticulum - postop pain control and ambulation  Hypothyroid - synthroid   Seizure: free for 4 years  DOT on CPAP - night  monitor   Depression - citalopram   Hyperglycemia - Stress induced will follow  Urinary Retention - Flomax TOV now  Atelectasis - ambulation and Incentive spirometry stressed to patient
HPI: Post Op     Allergies    cortisone (Headache)  Dilantin (Swelling)  Oxymetazoline (Rash)  prednisoLONE (Headache)  Zithromax (Hives)    Intolerances      Perforated diverticulum  Hypothyroid  Seizure  DOT on CPAP  HLD (hyperlipidemia)  History of diverticulitis  History of diverticulosis  Depression  HNP (herniated nucleus pulposus), cervical  Blood clot in vein    MEDICATIONS  (STANDING):  acetaminophen   Tablet .. 650 milliGRAM(s) Oral every 6 hours  alvimopan 12 milliGRAM(s) Oral two times a day  atorvastatin 20 milliGRAM(s) Oral at bedtime  cefoTEtan  IVPB 2 Gram(s) IV Intermittent once  citalopram 40 milliGRAM(s) Oral daily  enoxaparin Injectable 40 milliGRAM(s) SubCutaneous daily  lactated ringers. 1000 milliLiter(s) (75 mL/Hr) IV Continuous <Continuous>  lactobacillus acidophilus 1 Tablet(s) Oral two times a day  levothyroxine 137 MICROGram(s) Oral daily  tamsulosin 0.4 milliGRAM(s) Oral at bedtime    MEDICATIONS  (PRN):  acetaminophen   Tablet .. 650 milliGRAM(s) Oral every 6 hours PRN Mild Pain (1 - 3)  bisacodyl Suppository 10 milliGRAM(s) Rectal daily PRN Constipation  ondansetron Injectable 4 milliGRAM(s) IV Push every 6 hours PRN Nausea and/or Vomiting  oxyCODONE    IR 5 milliGRAM(s) Oral every 8 hours PRN Moderate Pain (4 - 6)  oxyCODONE    IR 10 milliGRAM(s) Oral every 8 hours PRN Severe Pain (7 - 10)                           10.5   8.17  )-----------( 207      ( 08 Sep 2019 06:35 )             33.2     09-08    138  |  102  |  7.0<L>  ----------------------------<  129<H>  3.6   |  26.0  |  0.70    Ca    8.2<L>      08 Sep 2019 06:35  Phos  2.5     09-08  Mg     2.2     09-08        ;  Vital Signs Last 24 Hrs  T(C): 37.1 (08 Sep 2019 16:09), Max: 37.2 (08 Sep 2019 00:02)  T(F): 98.8 (08 Sep 2019 16:09), Max: 99 (08 Sep 2019 00:02)  HR: 90 (08 Sep 2019 16:09) (89 - 104)  BP: 137/69 (08 Sep 2019 16:09) (99/67 - 137/69)  BP(mean): --  RR: 18 (08 Sep 2019 16:09) (18 - 18)  SpO2: 97% (08 Sep 2019 16:09) (93% - 97%)  CAPILLARY BLOOD GLUCOSE      09-07 @ 07:01  -  09-08 @ 07:00  --------------------------------------------------------  IN: 1350 mL / OUT: 1175 mL / NET: 175 mL    09-08 @ 07:01  -  09-08 @ 19:56  --------------------------------------------------------  IN: 0 mL / OUT: 1200 mL / NET: -1200 mL      Patient feeling better No CP, No SOB, No nausea Tolerating clears No Flatus   HEENT: PEARLA  Neck: Supple  Cardio: S1 S2 No Murmur  Pulm: No Rales or Ronchi Improved Air entry   Abd: Soft NT Mild Distension BS dec Incisions clean   Rectal - refused  Ext: No DCT  Skin: No Rash  Neuro: Awake Pleasant    Perforated diverticulum - postop pain control and ambulation  Hypothyroid - synthroid   Seizure: free for 4 years  DOT on CPAP - night  monitor   Depression - citalopram   Hyperglycemia - Stress induced will follow  Urinary Retention - Flomax TOV now  Atelectasis - ambulation and Incentive spirometry stressed to patient
INTERVAL HPI/OVERNIGHT EVENTS:    Patient passed trial of void. Has been ambulating and continues to have no change in abdominal pain. occasional flatus no BM. no n/v. no f/c, CP or SOB. remains on home home cpap at night and relies on 2L n/c for comfort    MEDICATIONS  (STANDING):  acetaminophen   Tablet .. 650 milliGRAM(s) Oral every 6 hours  alvimopan 12 milliGRAM(s) Oral two times a day  atorvastatin 20 milliGRAM(s) Oral at bedtime  cefoTEtan  IVPB 2 Gram(s) IV Intermittent once  citalopram 40 milliGRAM(s) Oral daily  enoxaparin Injectable 40 milliGRAM(s) SubCutaneous daily  HYDROmorphone PCA (1 mG/mL) 30 milliLiter(s) PCA Continuous PCA Continuous  lactated ringers. 1000 milliLiter(s) (75 mL/Hr) IV Continuous <Continuous>  lactobacillus acidophilus 1 Tablet(s) Oral two times a day  levothyroxine 137 MICROGram(s) Oral daily  tamsulosin 0.4 milliGRAM(s) Oral at bedtime    MEDICATIONS  (PRN):  ondansetron Injectable 4 milliGRAM(s) IV Push every 6 hours PRN Nausea and/or Vomiting      Vital Signs Last 24 Hrs  T(C): 37.2 (08 Sep 2019 00:02), Max: 37.2 (08 Sep 2019 00:02)  T(F): 99 (08 Sep 2019 00:02), Max: 99 (08 Sep 2019 00:02)  HR: 94 (08 Sep 2019 00:02) (81 - 104)  BP: 108/72 (08 Sep 2019 00:02) (101/65 - 114/73)  BP(mean): --  RR: 18 (08 Sep 2019 00:02) (18 - 18)  SpO2: 95% (08 Sep 2019 00:02) (92% - 98%)    Physical Exam:    Constitutional: NAD  HEENT: PERRL, EOMI, 2L n/c in  Neck: No JVD, FROM without pain  Respiratory: Respirations non-labored, no accessory muscle use  Cardiovascular: Regular rate & rhythm  Gastrointestinal: obese, Soft, tender to palpation, Prevena in place with good suction   mason has been removed      I&O's Detail    06 Sep 2019 07:01  -  07 Sep 2019 07:00  --------------------------------------------------------  IN:    lactated ringers.: 900 mL    Oral Fluid: 80 mL  Total IN: 980 mL    OUT:    Indwelling Catheter - Urethral: 475 mL    Voided: 10 mL  Total OUT: 485 mL    Total NET: 495 mL      07 Sep 2019 07:01  -  08 Sep 2019 04:23  --------------------------------------------------------  IN:    lactated ringers.: 1200 mL  Total IN: 1200 mL    OUT:    Voided: 1175 mL  Total OUT: 1175 mL    Total NET: 25 mL          LABS:                        12.9   10.38 )-----------( 256      ( 07 Sep 2019 05:12 )             41.6     09-07    139  |  104  |  13.0  ----------------------------<  137<H>  4.7   |  26.0  |  0.84    Ca    8.4<L>      07 Sep 2019 05:12  Phos  3.3     09-07  Mg     1.9     09-07            RADIOLOGY & ADDITIONAL STUDIES:
INTERVAL HPI/OVERNIGHT EVENTS:  Patient evaluated at bedside and found hemodynamically stable and in no acute distress. Patient tolerating diet although she has not passed gas or a BM. Patient reports minimal pain in abdomen and that she leaned on the tubing of cristino dressing and broke off. Otherwise patient has no major complaints. PCA was discontinue 9/8 and changed to PO pain medication.    STATUS POST: LAR, umbo repair with mesh, and ureteral stents-    POST OPERATIVE DAY #: 3    SUBJECTIVE:  Flatus: [ ] YES [x ] NO             Bowel Movement: [ ] YES [x ] NO  Nausea: [ ] YES [x ] NO            Vomiting: [ ] YES [x ] NO  Diarrhea: [ ] YES [x ] NO           Chest Pain: [ ] YES [x ] NO    SOB:  [ ] YES [x ] NO    MEDICATIONS  (STANDING):  acetaminophen   Tablet .. 650 milliGRAM(s) Oral every 6 hours  alvimopan 12 milliGRAM(s) Oral two times a day  atorvastatin 20 milliGRAM(s) Oral at bedtime  cefoTEtan  IVPB 2 Gram(s) IV Intermittent once  citalopram 40 milliGRAM(s) Oral daily  enoxaparin Injectable 40 milliGRAM(s) SubCutaneous daily  lactated ringers. 1000 milliLiter(s) (75 mL/Hr) IV Continuous <Continuous>  lactobacillus acidophilus 1 Tablet(s) Oral two times a day  levothyroxine 137 MICROGram(s) Oral daily  tamsulosin 0.4 milliGRAM(s) Oral at bedtime    MEDICATIONS  (PRN):  acetaminophen   Tablet .. 650 milliGRAM(s) Oral every 6 hours PRN Mild Pain (1 - 3)  bisacodyl Suppository 10 milliGRAM(s) Rectal daily PRN Constipation  ondansetron Injectable 4 milliGRAM(s) IV Push every 6 hours PRN Nausea and/or Vomiting  oxyCODONE    IR 5 milliGRAM(s) Oral every 8 hours PRN Moderate Pain (4 - 6)  oxyCODONE    IR 10 milliGRAM(s) Oral every 8 hours PRN Severe Pain (7 - 10)      Vital Signs Last 24 Hrs  T(C): 37.1 (08 Sep 2019 23:59), Max: 37.1 (08 Sep 2019 04:56)  T(F): 98.8 (08 Sep 2019 23:59), Max: 98.8 (08 Sep 2019 16:09)  HR: 84 (08 Sep 2019 23:59) (84 - 92)  BP: 124/76 (08 Sep 2019 23:59) (99/67 - 137/69)  BP(mean): --  RR: 18 (08 Sep 2019 23:59) (18 - 19)  SpO2: 95% (08 Sep 2019 23:59) (94% - 97%)    PHYSICAL EXAM:  General: AAOx3 in no acute distress  Chest: non-labored breathing  Abdomen: less distended, soft and depressible, tenderness to epigastric region. No rebound or guarding. CRISTINO dressing in place not appropriately connected dressing will be change for a new one.        I&O's Detail    07 Sep 2019 07:01  -  08 Sep 2019 07:00  --------------------------------------------------------  IN:    lactated ringers.: 1350 mL  Total IN: 1350 mL    OUT:    Voided: 1175 mL  Total OUT: 1175 mL    Total NET: 175 mL      08 Sep 2019 07:01  -  09 Sep 2019 03:32  --------------------------------------------------------  IN:  Total IN: 0 mL    OUT:    Voided: 2500 mL  Total OUT: 2500 mL    Total NET: -2500 mL          LABS:                        10.5   8.17  )-----------( 207      ( 08 Sep 2019 06:35 )             33.2     09-08    138  |  102  |  7.0<L>  ----------------------------<  129<H>  3.6   |  26.0  |  0.70    Ca    8.2<L>      08 Sep 2019 06:35  Phos  2.5     09-08  Mg     2.2     09-08
OR D/W patient ,incisional discomfort  ,No BM or flatus ,Labs satisfactory ,mobilize ,full liquids when passing flatus ,
Passed flatus and had BM ,feels well ,K suppliment  ,home today .
Post-operative check  INTERVAL HPI/OVERNIGHT EVENTS:    Patient tolerated surgery well. Has continued abdominal pain since surgery. no nausea or emesis. has not eaten yet. Has not ambulated. has no voided, end of TOV is at 7pm tonight. no f/c. no cp or sob    MEDICATIONS  (STANDING):  acetaminophen   Tablet .. 650 milliGRAM(s) Oral every 6 hours  atorvastatin 20 milliGRAM(s) Oral at bedtime  cefoTEtan  IVPB 2 Gram(s) IV Intermittent once  citalopram 40 milliGRAM(s) Oral daily  HYDROmorphone PCA (1 mG/mL) 30 milliLiter(s) PCA Continuous PCA Continuous  lactated ringers. 1000 milliLiter(s) (75 mL/Hr) IV Continuous <Continuous>  levothyroxine 137 MICROGram(s) Oral daily    MEDICATIONS  (PRN):  ondansetron Injectable 4 milliGRAM(s) IV Push every 6 hours PRN Nausea and/or Vomiting      Vital Signs Last 24 Hrs  T(C): 36.8 (06 Sep 2019 15:17), Max: 37.1 (06 Sep 2019 13:30)  T(F): 98.2 (06 Sep 2019 15:17), Max: 98.8 (06 Sep 2019 13:30)  HR: 80 (06 Sep 2019 15:17) (68 - 90)  BP: 120/76 (06 Sep 2019 15:17) (120/76 - 137/79)  BP(mean): --  RR: 18 (06 Sep 2019 15:17) (15 - 19)  SpO2: 93% (06 Sep 2019 15:17) (93% - 99%)    Physical Exam:    Neurological:  No sensory/motor deficits    HEENT: PERRLA, EOMI, no drainage or redness    Respiratory: Breath Sounds equal & clear to auscultation, no accessory muscle use    Cardiovascular: Regular rate & rhythm, normal S1, S2; no murmurs, gallops or rubs    Gastrointestinal: Soft, diffusely tender, non-peritonitic, prevena in place across umbilicus    Extremities: No peripheral edema, No cyanosis, clubbing     Vascular: Equal and normal pulses: 2+ peripheral pulses throughout        I&O's Detail
Subjective: Patient was seen and examined. Pain is well controlled w/ PCA pump. No flatus or BM yet. Had some slight nausea overnight which has since resolved. Bowers was removed directly after surgery yesterday and patient had urinary retention overnight with some mild hematuria. Bowers was replaced and draining well with clear, yellow urine. Tolerating CLD. Denies HA, fever, chills, chest pain, SOB.     STATUS POST: Lap LAR, umbilical hernia repair w/ mesh and bilateral ureteral stents.     POST OPERATIVE DAY #: 1    MEDICATIONS  (STANDING):  acetaminophen   Tablet .. 650 milliGRAM(s) Oral every 6 hours  alvimopan 12 milliGRAM(s) Oral two times a day  atorvastatin 20 milliGRAM(s) Oral at bedtime  cefoTEtan  IVPB 2 Gram(s) IV Intermittent once  citalopram 40 milliGRAM(s) Oral daily  enoxaparin Injectable 40 milliGRAM(s) SubCutaneous daily  HYDROmorphone PCA (1 mG/mL) 30 milliLiter(s) PCA Continuous PCA Continuous  lactated ringers. 1000 milliLiter(s) (75 mL/Hr) IV Continuous <Continuous>  lactobacillus acidophilus 1 Tablet(s) Oral two times a day  levothyroxine 137 MICROGram(s) Oral daily  tamsulosin 0.4 milliGRAM(s) Oral at bedtime    MEDICATIONS  (PRN):  ondansetron Injectable 4 milliGRAM(s) IV Push every 6 hours PRN Nausea and/or Vomiting      Vital Signs Last 24 Hrs  T(C): 36.7 (07 Sep 2019 07:51), Max: 37.1 (06 Sep 2019 13:30)  T(F): 98.1 (07 Sep 2019 07:51), Max: 98.8 (06 Sep 2019 13:30)  HR: 81 (07 Sep 2019 07:51) (80 - 100)  BP: 105/68 (07 Sep 2019 07:51) (101/65 - 137/79)  BP(mean): --  RR: 18 (07 Sep 2019 07:51) (15 - 19)  SpO2: 98% (07 Sep 2019 07:51) (90% - 99%)      09-06 - 09-07  --------------------------------------------------------  IN:    lactated ringers.: 900 mL    Oral Fluid: 80 mL  Total IN: 980 mL    OUT:    Indwelling Catheter - Urethral: 475 mL    Voided: 10 mL  Total OUT: 485 mL    Total NET: 495 mL      09-07 - 09-07  --------------------------------------------------------  IN:    lactated ringers.: 450 mL  Total IN: 450 mL    OUT:  Total OUT: 0 mL    Total NET: 450 mL    Physical Exam:    Constitutional: NAD  HEENT: PERRL, EOMI  Neck: No JVD, FROM without pain  Respiratory: Respirations non-labored, no accessory muscle use  Cardiovascular: Regular rate & rhythm  Gastrointestinal: Soft, tender to palpation, Prevena in place with good suction  : Bowers in place draining clear, yellow urine      LABS:                        12.9   10.38 )-----------( 256      ( 07 Sep 2019 05:12 )             41.6     09-07    139  |  104  |  13.0  ----------------------------<  137<H>  4.7   |  26.0  |  0.84    Ca    8.4<L>      07 Sep 2019 05:12  Phos  3.3     09-07  Mg     1.9     09-07
Trial of voiding Pm today ,OOB This am and ambulate ,spirometry  ,F/U Labs for am.

## 2019-09-09 NOTE — DISCHARGE NOTE PROVIDER - HOSPITAL COURSE
59 year old female present with c/o LLQ abd pain.  CAT scan revealed diverticulitis and now the presence  of a large diverticulum adjacent to a prior sigmoid resection and anastomosis.  She is now schedule for laparoscopic possible open resection of sigmoid diverticulitis and all related procedures and ureteral stent placement with DR. Jean. Patient went to the OR on 9/6/19 for Laparoscopic hand assisted low anterior resection, incisional umbilical hernia repair with parietex mesh. Patient tolerated surgery well. Ureteral stents and mason removed in OR. Post-operatively she had difficulty voiding on her own, requiring a mason placement to POD 1. She was able to void on her own. Given patient is now able to tolerate PO intake, has return of bowel function with flatus, can ambulate, and is able to void on her own, she can be discharge home.

## 2019-09-09 NOTE — DISCHARGE NOTE PROVIDER - CARE PROVIDER_API CALL
Brian Parker)  Surgery  [address] [place] [address]  Phone: [phone]  Fax: [phone]  Follow Up Time: 1 week

## 2019-09-09 NOTE — PROGRESS NOTE ADULT - PROVIDER SPECIALTY LIST ADULT
Family Medicine
Family Medicine
Surgery
Pain Medicine

## 2019-09-12 LAB — SURGICAL PATHOLOGY STUDY: SIGNIFICANT CHANGE UP

## 2020-10-14 NOTE — H&P PST ADULT - MUSCULOSKELETAL
[facility] - [facility]    This is a pre read worksheet - prior to official physician interpretation    [address] [place] [address]  1950  Date of study: 10/14/20    Indication for study:  AAA  Study :  Aortic Ultrasound    Diameter Aorta:     Proximal:   cm     Mid:   cm     Distal:  4.9 x 5.0 cm     Right iliac: 3.0 x 3.1 cm     Left iliac: 3.0 x 3.0 cm    Additional comments: no leaks visualized        LAST STUDY  8/14/2019 CTA  5.2 cm  Rt 3.4  Lt 3.3 details… No joint pain, swelling or deformity; no limitation of movement

## 2022-06-22 NOTE — BRIEF OPERATIVE NOTE - NSICDXBRIEFOPLAUNCH_GEN_ALL_CORE
Called and left message in regards to her mammo and dexa order still pending in chart. Also mailed letter.  
<--- Click to Launch ICDx for PreOp, PostOp and Procedure

## 2023-04-17 PROBLEM — Z00.00 ENCOUNTER FOR PREVENTIVE HEALTH EXAMINATION: Status: ACTIVE | Noted: 2023-04-17

## 2025-06-08 ENCOUNTER — NON-APPOINTMENT (OUTPATIENT)
Age: 65
End: 2025-06-08